# Patient Record
Sex: MALE | Race: BLACK OR AFRICAN AMERICAN | NOT HISPANIC OR LATINO | Employment: OTHER | URBAN - METROPOLITAN AREA
[De-identification: names, ages, dates, MRNs, and addresses within clinical notes are randomized per-mention and may not be internally consistent; named-entity substitution may affect disease eponyms.]

---

## 2019-06-03 ENCOUNTER — HOSPITAL ENCOUNTER (EMERGENCY)
Facility: HOSPITAL | Age: 59
Discharge: HOME | End: 2019-06-04
Attending: EMERGENCY MEDICINE

## 2019-06-03 DIAGNOSIS — F19.90 EXCESSIVE USE OF NONSTEROIDAL ANTI-INFLAMMATORY DRUG (NSAID): ICD-10-CM

## 2019-06-03 DIAGNOSIS — K22.6 MALLORY-WEISS TEAR: ICD-10-CM

## 2019-06-03 DIAGNOSIS — K29.21 CHRONIC ALCOHOLIC GASTRITIS WITH HEMORRHAGE: Primary | ICD-10-CM

## 2019-06-03 LAB
ALBUMIN SERPL-MCNC: 3.3 G/DL
ALP SERPL-CCNC: 73 U/L (ref 42–141)
BILIRUB SERPL-MCNC: 0.9 MG/DL (ref 0.2–1.6)
BUN SERPL-MCNC: 22 MG/DL (ref 7–22)
CALCIUM SERPL-MCNC: 10.3 MG/DL (ref 8–10.3)
CHLORIDE SERPL-SCNC: 106 MMOL/L (ref 98–108)
CREAT SERPL-MCNC: 1.3 MG/DL (ref 0.6–1.2)
GLUCOSE SERPL-MCNC: 135 MG/DL (ref 73–118)
POC ALT (SGPT): 17 U/L (ref 10–47)
POC AST (SGOT): 25 U/L (ref 11–38)
POC PTINR: 1.2 (ref 0.9–1.2)
POC PTWBT: 14.7 SEC (ref 9.7–14.3)
POC TCO2: 28 MMOL/L (ref 18–33)
POTASSIUM BLD-SCNC: 4.2 MMOL/L (ref 3.6–5.1)
PROTEIN, POC: 9.6 G/DL (ref 6.4–8.1)
SAMPLE: ABNORMAL
SODIUM BLD-SCNC: 139 MMOL/L (ref 128–145)

## 2019-06-03 PROCEDURE — 85610 PROTHROMBIN TIME: CPT | Mod: ER

## 2019-06-03 PROCEDURE — 25000003 PHARM REV CODE 250: Mod: ER | Performed by: EMERGENCY MEDICINE

## 2019-06-03 PROCEDURE — 96374 THER/PROPH/DIAG INJ IV PUSH: CPT | Mod: ER

## 2019-06-03 PROCEDURE — 80053 COMPREHEN METABOLIC PANEL: CPT | Mod: ER

## 2019-06-03 PROCEDURE — C9113 INJ PANTOPRAZOLE SODIUM, VIA: HCPCS | Mod: ER | Performed by: EMERGENCY MEDICINE

## 2019-06-03 PROCEDURE — 63600175 PHARM REV CODE 636 W HCPCS: Mod: ER | Performed by: EMERGENCY MEDICINE

## 2019-06-03 PROCEDURE — 83690 ASSAY OF LIPASE: CPT

## 2019-06-03 PROCEDURE — 96361 HYDRATE IV INFUSION ADD-ON: CPT | Mod: ER

## 2019-06-03 PROCEDURE — 96375 TX/PRO/DX INJ NEW DRUG ADDON: CPT | Mod: ER

## 2019-06-03 PROCEDURE — 85025 COMPLETE CBC W/AUTO DIFF WBC: CPT | Mod: ER

## 2019-06-03 PROCEDURE — 99284 EMERGENCY DEPT VISIT MOD MDM: CPT | Mod: 25,ER

## 2019-06-03 RX ORDER — PANTOPRAZOLE SODIUM 40 MG/10ML
40 INJECTION, POWDER, LYOPHILIZED, FOR SOLUTION INTRAVENOUS
Status: COMPLETED | OUTPATIENT
Start: 2019-06-03 | End: 2019-06-03

## 2019-06-03 RX ORDER — SODIUM CHLORIDE 9 MG/ML
1000 INJECTION, SOLUTION INTRAVENOUS
Status: COMPLETED | OUTPATIENT
Start: 2019-06-03 | End: 2019-06-03

## 2019-06-03 RX ORDER — ONDANSETRON 2 MG/ML
8 INJECTION INTRAMUSCULAR; INTRAVENOUS
Status: COMPLETED | OUTPATIENT
Start: 2019-06-03 | End: 2019-06-03

## 2019-06-03 RX ADMIN — PANTOPRAZOLE SODIUM 40 MG: 40 INJECTION, POWDER, FOR SOLUTION INTRAVENOUS at 11:06

## 2019-06-03 RX ADMIN — SODIUM CHLORIDE 1000 ML: 0.9 INJECTION, SOLUTION INTRAVENOUS at 10:06

## 2019-06-03 RX ADMIN — ONDANSETRON 8 MG: 2 INJECTION INTRAMUSCULAR; INTRAVENOUS at 10:06

## 2019-06-04 VITALS
TEMPERATURE: 99 F | BODY MASS INDEX: 30.38 KG/M2 | SYSTOLIC BLOOD PRESSURE: 118 MMHG | HEART RATE: 81 BPM | OXYGEN SATURATION: 97 % | HEIGHT: 71 IN | RESPIRATION RATE: 21 BRPM | WEIGHT: 217 LBS | DIASTOLIC BLOOD PRESSURE: 82 MMHG

## 2019-06-04 LAB — LIPASE SERPL-CCNC: 36 U/L (ref 4–60)

## 2019-06-04 RX ORDER — ONDANSETRON 4 MG/1
4 TABLET, ORALLY DISINTEGRATING ORAL EVERY 6 HOURS PRN
Qty: 10 TABLET | Refills: 0 | Status: SHIPPED | OUTPATIENT
Start: 2019-06-04

## 2019-06-04 RX ORDER — TRAMADOL HYDROCHLORIDE 50 MG/1
50 TABLET ORAL EVERY 6 HOURS PRN
Qty: 12 TABLET | Refills: 0 | Status: SHIPPED | OUTPATIENT
Start: 2019-06-04 | End: 2019-06-14

## 2019-06-04 NOTE — ED PROVIDER NOTES
Encounter Date: 6/3/2019    SCRIBE #1 NOTE: I, Ashley Her, am scribing for, and in the presence of,  Dr. Molina. I have scribed the following portions of the note - Other sections scribed: HPI, ROS, PE.       History     Chief Complaint   Patient presents with    Hematemesis     PT REPORTS  VOMITING BRIGHT RED BLOOD FOR 2 DAYS AND BURNING SENSATION IN EPIGASTRIC AREA     Lyndon Saeed is a 59 y.o. male who presents to the ED complaining of vomiting bright red blood and burning in upper abdomen which started two days ago. Pt reports he is a chronic drinker. Pt has hx of gout for which he takes antinflamitory medication. Pt consistently drinks while taking medication.    The history is provided by the patient.     Review of patient's allergies indicates:  No Known Allergies  History reviewed. No pertinent past medical history.  Past Surgical History:   Procedure Laterality Date    CIRCUMCISION       No family history on file.  Social History     Tobacco Use    Smoking status: Never Smoker    Smokeless tobacco: Never Used   Substance Use Topics    Alcohol use: Yes     Comment: DAILY    Drug use: Never     Review of Systems   Constitutional: Negative.  Negative for fever.   HENT: Negative.  Negative for sore throat.    Eyes: Negative.  Negative for pain.   Respiratory: Negative.  Negative for shortness of breath.    Cardiovascular: Negative.  Negative for chest pain.   Gastrointestinal: Positive for abdominal pain and vomiting.   Genitourinary: Negative.  Negative for dysuria.   Musculoskeletal: Negative.  Negative for back pain.   Skin: Negative.  Negative for rash.   Neurological: Negative.  Negative for headaches.   Hematological: Negative.    Psychiatric/Behavioral: Negative.    All other systems reviewed and are negative.      Physical Exam     Initial Vitals [06/03/19 2226]   BP Pulse Resp Temp SpO2   133/78 107 20 99.2 °F (37.3 °C) 97 %      MAP       --         Physical Exam    Nursing note and vitals  reviewed.  Constitutional: He appears well-developed and well-nourished.   HENT:   Head: Normocephalic and atraumatic.   Eyes: Conjunctivae and EOM are normal.   Neck: Normal range of motion. Neck supple.   Cardiovascular: Normal rate and intact distal pulses.   Pulmonary/Chest: Effort normal. No respiratory distress.   Abdominal: Soft. There is no tenderness.   Pt has dried blood in the corner of mouth.   Musculoskeletal: Normal range of motion.   Neurological: He is alert and oriented to person, place, and time.   Skin: Skin is warm and dry.   Psychiatric: He has a normal mood and affect. His behavior is normal.         ED Course   Procedures  Labs Reviewed   ISTAT PROCEDURE - Abnormal; Notable for the following components:       Result Value    POC PTWBT 14.7 (*)     All other components within normal limits   POCT CMP - Abnormal; Notable for the following components:    POC Creatinine 1.3 (*)     POC Glucose 135 (*)     Protein 9.6 (*)     All other components within normal limits   LIPASE   POCT CBC   POCT CMP   POCT PROTIME-INR          Imaging Results    None          Medical Decision Making:   Clinical Tests:   Lab Tests: Ordered and Reviewed  ED Management:  This patient has had no further episode of emesis.  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  Patient is also chronic alcoholic.  There is no evidence of present to suggest withdrawal and/or pancreatitis.  Patient's H&H is within normal limits in his BUN is normal. Feel the patient can be safely discharged home of provide him with appropriate pain management instructions to discontinue alcohol consumption and Protonix.        Results for orders placed or performed during the hospital encounter of 06/03/19   ISTAT PROCEDURE   Result Value Ref Range    POC PTWBT 14.7 (H) 9.7 - 14.3 sec    POC PTINR 1.2 0.9 - 1.2    Sample UNK    POCT CMP   Result Value Ref Range    Albumin, POC 3.3 g/dL     Alkaline Phosphatase, POC 73 42 - 141 U/L    ALT (SGPT), POC 17 10 - 47 U/L    AST (SGOT), POC 25 11 - 38 U/L    POC BUN 22 7 - 22 mg/dL    Calcium, POC 10.3 8 - 10.3 mg/dL    POC Chloride 106 98 - 108 mmol/L    POC Creatinine 1.3 (H) 0.6 - 1.2 mg/dL    POC Glucose 135 (H) 73 - 118 mg/dL    POC Potassium 4.2 3.6 - 5.1 mmol/L    POC Sodium 139 128 - 145 mmol/L    Bilirubin 0.9 0.2 - 1.6 mg/dL    POC TCO2 28 18 - 33 mmol/L    Protein 9.6 (H) 6.4 - 8.1 g/dL            Scribe Attestation:   Scribe #1: I performed the above scribed service and the documentation accurately describes the services I performed. I attest to the accuracy of the note.    This document was produced by a scribe under my direction and in my presence. I agree with the content of the note and have made any necessary edits.     Rizwan Molina MD    06/04/2019 1:41 AM           Clinical Impression:     1. Chronic alcoholic gastritis with hemorrhage    2. Cherry-Borges tear    3. Excessive use of nonsteroidal anti-inflammatory drug (NSAID)                                 Rizwan Molina MD  06/04/19 0141

## 2019-06-04 NOTE — DISCHARGE INSTRUCTIONS
Stop taking anti-inflammatory medications.  Take the medications as prescribed and stop drinking alcohol.

## 2019-06-04 NOTE — ED NOTES
"Pt reports vomiting blood that started on yesterday. Pt states the bleeding increased today. Pt states he has been taking ibuprofen daily for several years. Pt states he was told by a dr years ago to take ibuprofen for his gout. Pt adds, he has been drinking a 6 pack of beer for years as well. Pt states he was vomiting "regular" food for 2 days and now it has progressed to blood.  "